# Patient Record
Sex: FEMALE | Race: WHITE | Employment: FULL TIME | URBAN - METROPOLITAN AREA
[De-identification: names, ages, dates, MRNs, and addresses within clinical notes are randomized per-mention and may not be internally consistent; named-entity substitution may affect disease eponyms.]

---

## 2017-01-19 RX ORDER — IRBESARTAN 150 MG/1
150 TABLET ORAL DAILY
COMMUNITY

## 2017-02-03 PROBLEM — E66.01 MORBID OBESITY DUE TO EXCESS CALORIES (HCC): Status: ACTIVE | Noted: 2017-02-03

## 2017-02-10 PROBLEM — D03.61 MELANOMA IN SITU OF RIGHT UPPER EXTREMITY INCLUDING SHOULDER (HCC): Status: ACTIVE | Noted: 2017-02-10

## 2017-02-10 PROBLEM — D03.72 MELANOMA IN SITU OF LEFT LOWER LEG (HCC): Status: ACTIVE | Noted: 2017-02-10

## 2017-02-16 ENCOUNTER — ANESTHESIA (OUTPATIENT)
Dept: SURGERY | Facility: HOSPITAL | Age: 77
End: 2017-02-16
Payer: MEDICARE

## 2017-02-16 ENCOUNTER — ANESTHESIA EVENT (OUTPATIENT)
Dept: SURGERY | Facility: HOSPITAL | Age: 77
End: 2017-02-16
Payer: MEDICARE

## 2017-02-16 ENCOUNTER — HOSPITAL ENCOUNTER (OUTPATIENT)
Facility: HOSPITAL | Age: 77
Setting detail: HOSPITAL OUTPATIENT SURGERY
Discharge: HOME | End: 2017-02-16
Attending: OBSTETRICS & GYNECOLOGY | Admitting: OBSTETRICS & GYNECOLOGY
Payer: MEDICARE

## 2017-02-16 ENCOUNTER — SURGERY (OUTPATIENT)
Age: 77
End: 2017-02-16

## 2017-02-16 VITALS
DIASTOLIC BLOOD PRESSURE: 76 MMHG | HEART RATE: 77 BPM | RESPIRATION RATE: 16 BRPM | BODY MASS INDEX: 44.73 KG/M2 | TEMPERATURE: 98 F | WEIGHT: 240 LBS | SYSTOLIC BLOOD PRESSURE: 145 MMHG | HEIGHT: 61.5 IN | OXYGEN SATURATION: 97 %

## 2017-02-16 PROCEDURE — 88305 TISSUE EXAM BY PATHOLOGIST: CPT | Performed by: OBSTETRICS & GYNECOLOGY

## 2017-02-16 PROCEDURE — 0UDB8ZX EXTRACTION OF ENDOMETRIUM, VIA NATURAL OR ARTIFICIAL OPENING ENDOSCOPIC, DIAGNOSTIC: ICD-10-PCS | Performed by: OBSTETRICS & GYNECOLOGY

## 2017-02-16 RX ORDER — DEXAMETHASONE SODIUM PHOSPHATE 4 MG/ML
VIAL (ML) INJECTION
Status: COMPLETED
Start: 2017-02-16 | End: 2017-02-16

## 2017-02-16 RX ORDER — HYDROCODONE BITARTRATE AND ACETAMINOPHEN 5; 325 MG/1; MG/1
2 TABLET ORAL AS NEEDED
Status: DISCONTINUED | OUTPATIENT
Start: 2017-02-16 | End: 2017-02-16

## 2017-02-16 RX ORDER — DEXAMETHASONE SODIUM PHOSPHATE 4 MG/ML
4 VIAL (ML) INJECTION AS NEEDED
Status: DISCONTINUED | OUTPATIENT
Start: 2017-02-16 | End: 2017-02-16

## 2017-02-16 RX ORDER — SODIUM CHLORIDE, SODIUM LACTATE, POTASSIUM CHLORIDE, CALCIUM CHLORIDE 600; 310; 30; 20 MG/100ML; MG/100ML; MG/100ML; MG/100ML
INJECTION, SOLUTION INTRAVENOUS CONTINUOUS
Status: DISCONTINUED | OUTPATIENT
Start: 2017-02-16 | End: 2017-02-16

## 2017-02-16 RX ORDER — HYDROCODONE BITARTRATE AND ACETAMINOPHEN 5; 325 MG/1; MG/1
1 TABLET ORAL AS NEEDED
Status: DISCONTINUED | OUTPATIENT
Start: 2017-02-16 | End: 2017-02-16

## 2017-02-16 RX ORDER — HYDROMORPHONE HYDROCHLORIDE 1 MG/ML
INJECTION, SOLUTION INTRAMUSCULAR; INTRAVENOUS; SUBCUTANEOUS
Status: COMPLETED
Start: 2017-02-16 | End: 2017-02-16

## 2017-02-16 RX ORDER — HYDROMORPHONE HYDROCHLORIDE 1 MG/ML
0.4 INJECTION, SOLUTION INTRAMUSCULAR; INTRAVENOUS; SUBCUTANEOUS EVERY 5 MIN PRN
Status: DISCONTINUED | OUTPATIENT
Start: 2017-02-16 | End: 2017-02-16

## 2017-02-16 RX ORDER — ONDANSETRON 2 MG/ML
4 INJECTION INTRAMUSCULAR; INTRAVENOUS AS NEEDED
Status: DISCONTINUED | OUTPATIENT
Start: 2017-02-16 | End: 2017-02-16

## 2017-02-16 RX ORDER — NALOXONE HYDROCHLORIDE 0.4 MG/ML
80 INJECTION, SOLUTION INTRAMUSCULAR; INTRAVENOUS; SUBCUTANEOUS AS NEEDED
Status: DISCONTINUED | OUTPATIENT
Start: 2017-02-16 | End: 2017-02-16

## 2017-02-16 NOTE — DISCHARGE SUMMARY
[facility]  Discharge Summary    Unknown [facility] Patient Status:  Hospital Outpatient Surgery    1940 MRN [id number]   Location [facility] Attending Zaida Saez MD   Hosp Day # 0 PCP Neel Watson

## 2017-02-16 NOTE — ANESTHESIA PREPROCEDURE EVALUATION
PRE-OP EVALUATION    Patient Name: Andreea Manning    Pre-op Diagnosis: POST MENOPAUSAL BLEEDING, THICKENED ENDOMETRIUM     Procedure(s):   HYSTEROSCOPY DILATION AND CURETTAGE      Surgeon(s) and Role:     * Aida Harris MD - Primary    Pre-op vitals rev Neuro/Psych  Comment: Bipolar Affective Disorder      (+) anxiety                      Outside labs and EKG reviewed.           Past Surgical History    OTHER SURGICAL HISTORY      Comment tibia fracture repair    LITHOTRIPSY Left Feb 2015    Comment Left P

## 2017-02-16 NOTE — OPERATIVE REPORT
Pre op Diagnosis: postmenopausal bleeding, thickened endometrium, morbid obesity    Post op Diagnosis: same    Procedure: Hysteroscopy, resection of endometrial tissue with Pancho Recio and Bharti Eayun, D and C    Surgeon:Andrés     Assistant:none    [facility] hemostatic. The patient was awaken and taken to the recovery room in good condition. EBL was 5 cc.

## 2017-02-16 NOTE — ANESTHESIA POSTPROCEDURE EVALUATION
[address] Patient Status:  Hospital Outpatient Surgery   Age/Gender 78year old female MRN [id number]   [facility] SURGERY Attending Vance Waller MD   Hosp Day # 0 PCP GLO BHATTI       Anesthesia Post-op

## 2017-02-20 NOTE — PROGRESS NOTES
Quick Note:    Pt returning call from [facility] for test results.  She can be reached at [phone].  ______

## 2017-02-20 NOTE — PROGRESS NOTES
Quick Note:    Spoke to patient. Pathology reviewed. Will refer to Matt Mata MD, Gyne Onc. Will make apt with him. Is scheduled for surgery for melanoma on Thursday, 3/23.   Briefly discussed surgery and recovery, and possible other treatments depending

## 2017-03-01 RX ORDER — AMPICILLIN 500 MG/1
500 CAPSULE ORAL DAILY
COMMUNITY
End: 2017-03-07

## 2017-03-02 ENCOUNTER — HOSPITAL ENCOUNTER (OUTPATIENT)
Facility: HOSPITAL | Age: 77
Setting detail: HOSPITAL OUTPATIENT SURGERY
Discharge: HOME | End: 2017-03-02
Attending: SURGERY | Admitting: SURGERY
Payer: MEDICARE

## 2017-03-02 ENCOUNTER — SURGERY (OUTPATIENT)
Age: 77
End: 2017-03-02

## 2017-03-02 VITALS
WEIGHT: 238.13 LBS | HEIGHT: 61 IN | DIASTOLIC BLOOD PRESSURE: 83 MMHG | OXYGEN SATURATION: 96 % | BODY MASS INDEX: 44.96 KG/M2 | RESPIRATION RATE: 16 BRPM | HEART RATE: 83 BPM | SYSTOLIC BLOOD PRESSURE: 143 MMHG | TEMPERATURE: 99 F

## 2017-03-02 PROCEDURE — 0HBLXZZ EXCISION OF LEFT LOWER LEG SKIN, EXTERNAL APPROACH: ICD-10-PCS | Performed by: SURGERY

## 2017-03-02 PROCEDURE — 88305 TISSUE EXAM BY PATHOLOGIST: CPT | Performed by: SURGERY

## 2017-03-02 PROCEDURE — 0HXLXZZ TRANSFER LEFT LOWER LEG SKIN, EXTERNAL APPROACH: ICD-10-PCS | Performed by: SURGERY

## 2017-03-02 PROCEDURE — 0JQG0ZZ REPAIR RIGHT LOWER ARM SUBCUTANEOUS TISSUE AND FASCIA, OPEN APPROACH: ICD-10-PCS | Performed by: SURGERY

## 2017-03-02 RX ORDER — ACETAMINOPHEN 500 MG
1000 TABLET ORAL EVERY 4 HOURS PRN
Qty: 30 TABLET | Refills: 0 | Status: SHIPPED | OUTPATIENT
Start: 2017-03-02

## 2017-03-02 RX ORDER — BUPIVACAINE HYDROCHLORIDE 5 MG/ML
INJECTION, SOLUTION EPIDURAL; INTRACAUDAL AS NEEDED
Status: DISCONTINUED | OUTPATIENT
Start: 2017-03-02 | End: 2017-03-02 | Stop reason: HOSPADM

## 2017-03-02 RX ORDER — LIDOCAINE HYDROCHLORIDE AND EPINEPHRINE 10; 10 MG/ML; UG/ML
INJECTION, SOLUTION INFILTRATION; PERINEURAL AS NEEDED
Status: DISCONTINUED | OUTPATIENT
Start: 2017-03-02 | End: 2017-03-02 | Stop reason: HOSPADM

## 2017-03-02 NOTE — H&P (VIEW-ONLY)
Curt Chaconsharmin is a 78year old woman  Patient presents with: Follow - Up: LOV: 9/2/16. pt states she is having a procedure for a uterine issue next hakeem       HPI:  This is a 78 y. o. woman known to me from previous melanoma excision of the left shoulder.   Sh Albuterol Sulfate HFA (PROVENTIL HFA) 108 (90 BASE) MCG/ACT Inhalation Aero Soln Inhale 2 puffs into the lungs every 6 (six) hours as needed. Disp:  Rfl:    celecoxib (CELEBREX) 200 MG Oral Cap Take 200 mg by mouth daily.    Disp:  Rfl:      Family Histor extremities. PSYCHIATRIC: Alert, oriented x 3    Labs: none    Imaging: [I reviewed the images myself when applicable] none     Pathology: A. Skin, right forearm, shave removal:  -Melanoma in situ, lentigo maligna type.   -The tumor involves the periphera

## 2017-03-02 NOTE — OPERATIVE REPORT
OPERATIVE REPORT           Procedure Date: 3/2/2017  Patient Name: Johnson Metzger  Preoperative Diagnosis: melanoma in-situ of right forearm, melanoma in-situ of left lower leg  Postoperative Diagnosis: melanoma in-situ of right forearm, melanoma in-situ of l lower leg   Drains: none  Condition: good

## 2017-03-02 NOTE — INTERVAL H&P NOTE
Pre-op Diagnosis: melanoma forearm, melanoma leg    The above referenced H&P was reviewed by Nannette Cee MD on 3/2/2017, the patient was examined and no significant changes have occurred in the patient's condition since the H&P was performed.   I discus

## 2017-10-23 PROBLEM — Z85.820 HISTORY OF MALIGNANT MELANOMA: Status: ACTIVE | Noted: 2017-10-23

## 2017-10-23 PROBLEM — Z86.006 HISTORY OF MELANOMA IN SITU: Status: ACTIVE | Noted: 2017-10-23

## (undated) DEVICE — MINI LAP PACK-LF: Brand: MEDLINE INDUSTRIES, INC.

## (undated) DEVICE — SPECIMEN SOCK - STANDARD: Brand: MEDI-VAC

## (undated) DEVICE — DEV REMOVAL TRUCLEAR SFT MINI

## (undated) DEVICE — DRAPE TOWEL: Brand: CONVERTORS

## (undated) DEVICE — GAUZE SPONGES,USP TYPE VII GAUZE, 12 PLY: Brand: CURITY

## (undated) DEVICE — 2000CC GUARDIAN II: Brand: GUARDIAN

## (undated) DEVICE — HYSTEROSCOPIC INFLOW TUBE SET

## (undated) DEVICE — OUTFLOW HYSTER S&N

## (undated) DEVICE — DRAPE LINGEMAN 1-0425

## (undated) DEVICE — HYSTEROSCOPIC INSERT S&N

## (undated) DEVICE — SUTURE VICRYL 3-0 SH

## (undated) DEVICE — GLOVE SURG TRIUMPH SZ 7

## (undated) DEVICE — SOL  .9 1000ML BTL

## (undated) DEVICE — GLOVE SURG SENSICARE SZ 7-1/2

## (undated) DEVICE — SOL  .9 3000ML

## (undated) DEVICE — GYN CDS: Brand: MEDLINE INDUSTRIES, INC.

## (undated) DEVICE — SUTURE MONOCRYL 4-0 PS-2

## (undated) DEVICE — KENDALL SCD EXPRESS SLEEVES, KNEE LENGTH, MEDIUM: Brand: KENDALL SCD

## (undated) DEVICE — DEV REMOVAL TRUCLEAR SFT PLUS

## (undated) DEVICE — MEDI-VAC NON-CONDUCTIVE SUCTION TUBING: Brand: CARDINAL HEALTH

## (undated) DEVICE — GLOVE SURG SENSICARE SZ 6

## (undated) NOTE — IP AVS SNAPSHOT
[facility] [place] [address] [place], [address] ~ [phone]                Discharge Summary   2/16/2017    Elsy Reed           Admission Information        Provider Department    2/16/2017 Jimmy Ortiz MD  No [place]ama / Kevin Costa Generic drug:  Albuterol Sulfate HFA        Inhale 2 puffs into the lungs every 6 (six) hours as needed.       [    ]    [    ]    [    ]    [    ]                 Patient Instructions       Hysteroscopy and Dilatation and Curettage  Endometrial Ablation · New onset of leg pain and/or swelling    If a specimen was sent to pathology, you can expect a call from your doctor within 10 days with the report.    If your doctor requested a post op appointment, please call to schedule an appointment for 2 weeks post Additional Information       We are concerned for your overall well being:    - If you are a smoker or have smoked in the last 12 months, we encourage you to explore options for quitting.     - If you have concerns related to behavioral health issues or th

## (undated) NOTE — LETTER
[facility]  [address] [facility], [address] [place] [address]    Consent for Operation    Date: __________________    Time: _______________    1.  I authorize the performance upon Alejandra Bonner the following operation:    Procedure(s):  [address] revealed by the pictures or by descriptive texts accompanying them. If the procedure has been videotaped, the surgeon will obtain the original videotape. The hospital will not be responsible for storage or maintenance of this tape.     6. For the purpose of THAT MY DOCTOR PROVIDED ME WITH THE ABOVE EXPLANATIONS, THAT ALL BLANKS OR STATEMENTS REQUIRING INSERTION OR COMPLETION WERE FILLED IN.     Signature of Patient:   ___________________________    When the patient is a minor or mentally incompetent to give co supplements, and pills I can buy without a prescription (including street drugs/illegal medications). Failure to inform my anesthesiologist about these medicines may increase my risk of anesthetic complications.   · If I am allergic to anything or have had Anesthesiologist Signature     Date   Time  I have discussed the procedure and information above with the patient (or patient’s representative) and answered their questions. The patient or their representative has agreed to have anesthesia services.     ___

## (undated) NOTE — IP AVS SNAPSHOT
[facility] [place] [address] [place], [address] ~ [phone]                Discharge Summary   3/2/2017    Shade Nina           Admission Information        Provider Department    3/2/2017 Carl Diaz MD  Cristo Carrasco / Dom Staff [    ]    [    ]       metRONIDAZOLE 0.75 % Crea   Commonly known as:  METROCREAM        Apply to face QD _BID    Meghan Sesay     [    ]    [    ]    [    ]    [    ]       Owen AYALA [address]49 ([address]) MCG/ACT Aers   Generic drug:  Albuterol Sulfate Specialties:  SURGERY, GENERAL, Complex General Surgical Oncology, Surgical Oncology    Contact information:    [place] [id number]  [phone]        Future Appointments        Provider Department    4/20/2017 11:00 AM Ovi your Zip Code and Date of Birth to complete the sign-up process. If you do not sign up before the expiration date, you must request a new code.     Your unique Sup[employer] Access Code: [email or web address]  Expires: 3/13/2017  5:31 PM    If you have questions, you can c